# Patient Record
Sex: FEMALE | Race: WHITE
[De-identification: names, ages, dates, MRNs, and addresses within clinical notes are randomized per-mention and may not be internally consistent; named-entity substitution may affect disease eponyms.]

---

## 2017-10-29 ENCOUNTER — HOSPITAL ENCOUNTER (EMERGENCY)
Dept: HOSPITAL 25 - UCEAST | Age: 22
Discharge: HOME | End: 2017-10-29
Payer: COMMERCIAL

## 2017-10-29 VITALS — DIASTOLIC BLOOD PRESSURE: 72 MMHG | SYSTOLIC BLOOD PRESSURE: 135 MMHG

## 2017-10-29 DIAGNOSIS — R30.0: Primary | ICD-10-CM

## 2017-10-29 DIAGNOSIS — Z32.02: ICD-10-CM

## 2017-10-29 DIAGNOSIS — R35.0: ICD-10-CM

## 2017-10-29 DIAGNOSIS — Z88.0: ICD-10-CM

## 2017-10-29 DIAGNOSIS — R39.15: ICD-10-CM

## 2017-10-29 PROCEDURE — 87186 SC STD MICRODIL/AGAR DIL: CPT

## 2017-10-29 PROCEDURE — 81003 URINALYSIS AUTO W/O SCOPE: CPT

## 2017-10-29 PROCEDURE — 84702 CHORIONIC GONADOTROPIN TEST: CPT

## 2017-10-29 PROCEDURE — 87086 URINE CULTURE/COLONY COUNT: CPT

## 2017-10-29 PROCEDURE — 99202 OFFICE O/P NEW SF 15 MIN: CPT

## 2017-10-29 PROCEDURE — G0463 HOSPITAL OUTPT CLINIC VISIT: HCPCS

## 2017-10-29 PROCEDURE — 87077 CULTURE AEROBIC IDENTIFY: CPT

## 2017-10-29 NOTE — UC
Complaint Female HPI





- HPI Summary


HPI Summary: 





21 year old female presents with complains of frequency, urgency and burning 

with urination. 





- History Of Current Complaint


Stated Complaint: DYSURIA


Time Seen by Provider: 10/29/17 19:05


Hx Obtained From: Patient


Onset/Duration: Sudden Onset


Timing: Lasting Hours


Severity Initially: Moderate


Severity Currently: Moderate





- Allergies/Home Medications


Allergies/Adverse Reactions: 


 Allergies











Allergy/AdvReac Type Severity Reaction Status Date / Time


 


Penicillins Allergy Severe Rash Verified 10/29/17 19:07











Home Medications: 


 Home Medications





Ibuprofen TAB* [Advil TAB*] 600 mg PO ONCE PRN 10/29/17 [History Confirmed 10/29

/17]


Iud*  10/29/17 [History]











PMH/Surg Hx/FS Hx/Imm Hx


Previously Healthy: Yes





Review of Systems


Constitutional: Negative


Skin: Negative


Eyes: Negative


ENT: Negative


Respiratory: Negative


Cardiovascular: Negative


Gastrointestinal: Negative


Genitourinary: Frequency, Urgency


Motor: Negative


Neurovascular: Negative


Musculoskeletal: Negative


Neurological: Negative


Psychological: Negative


All Other Systems Reviewed And Are Negative: Yes





Physical Exam


Triage Information Reviewed: Yes


Eye Exam: Normal


ENT Exam: Normal


Dental Exam: Normal


Neck exam: Normal


Neck: Positive: 1


Respiratory Exam: Normal


Cardiovascular Exam: Normal


Abdominal Exam: Normal


Musculoskeletal Exam: Normal


Neurological Exam: Normal


Psychological Exam: Normal


Skin Exam: Normal





 Complaint Female Dx





- Differential Dx/Diagnosis


Provider Diagnoses: urinary frequency.  dysuria.  urinary urgency





Discharge





- Discharge Plan


Condition: Stable


Disposition: HOME


Prescriptions: 


Nitrofurantoin Monohyd Macro [Macrobid] 100 mg PO BID PC #14 cap


Patient Education Materials:  Urinary Tract Infection in Women (ED), Dysuria (ED

)


Referrals: 


Non Staff,Doctor [Primary Care Provider] -

## 2017-10-31 ENCOUNTER — HOSPITAL ENCOUNTER (EMERGENCY)
Dept: HOSPITAL 25 - ED | Age: 22
LOS: 1 days | Discharge: HOME | End: 2017-11-01
Payer: COMMERCIAL

## 2017-10-31 DIAGNOSIS — R42: ICD-10-CM

## 2017-10-31 DIAGNOSIS — R10.84: Primary | ICD-10-CM

## 2017-10-31 DIAGNOSIS — N12: ICD-10-CM

## 2017-10-31 DIAGNOSIS — R30.0: ICD-10-CM

## 2017-10-31 PROCEDURE — 85025 COMPLETE CBC W/AUTO DIFF WBC: CPT

## 2017-10-31 PROCEDURE — 81003 URINALYSIS AUTO W/O SCOPE: CPT

## 2017-10-31 PROCEDURE — 80053 COMPREHEN METABOLIC PANEL: CPT

## 2017-10-31 PROCEDURE — 83690 ASSAY OF LIPASE: CPT

## 2017-10-31 PROCEDURE — 99282 EMERGENCY DEPT VISIT SF MDM: CPT

## 2017-10-31 PROCEDURE — 76775 US EXAM ABDO BACK WALL LIM: CPT

## 2017-10-31 PROCEDURE — 83605 ASSAY OF LACTIC ACID: CPT

## 2017-10-31 PROCEDURE — 96375 TX/PRO/DX INJ NEW DRUG ADDON: CPT

## 2017-10-31 PROCEDURE — 96374 THER/PROPH/DIAG INJ IV PUSH: CPT

## 2017-10-31 PROCEDURE — 81015 MICROSCOPIC EXAM OF URINE: CPT

## 2017-10-31 PROCEDURE — 84702 CHORIONIC GONADOTROPIN TEST: CPT

## 2017-10-31 PROCEDURE — 86141 C-REACTIVE PROTEIN HS: CPT

## 2017-10-31 PROCEDURE — 36415 COLL VENOUS BLD VENIPUNCTURE: CPT

## 2017-11-01 VITALS — DIASTOLIC BLOOD PRESSURE: 74 MMHG | SYSTOLIC BLOOD PRESSURE: 122 MMHG

## 2017-11-01 LAB
ALBUMIN SERPL BCG-MCNC: 4.1 G/DL (ref 3.2–5.2)
ALP SERPL-CCNC: 63 U/L (ref 34–104)
ALT SERPL W P-5'-P-CCNC: 9 U/L (ref 7–52)
ANION GAP SERPL CALC-SCNC: 10 MMOL/L (ref 2–11)
AST SERPL-CCNC: 15 U/L (ref 13–39)
BUN SERPL-MCNC: 9 MG/DL (ref 6–24)
BUN/CREAT SERPL: 12 (ref 8–20)
CALCIUM SERPL-MCNC: 9.2 MG/DL (ref 8.6–10.3)
CHLORIDE SERPL-SCNC: 96 MMOL/L (ref 101–111)
GLOBULIN SER CALC-MCNC: 3.2 G/DL (ref 2–4)
GLUCOSE SERPL-MCNC: 91 MG/DL (ref 70–100)
HCO3 SERPL-SCNC: 23 MMOL/L (ref 22–32)
HCT VFR BLD AUTO: 39 % (ref 35–47)
HGB BLD-MCNC: 13.4 G/DL (ref 12–16)
LIPASE SERPL-CCNC: 27 U/L (ref 11–82)
MCH RBC QN AUTO: 31 PG (ref 27–31)
MCHC RBC AUTO-ENTMCNC: 35 G/DL (ref 31–36)
MCV RBC AUTO: 89 FL (ref 80–97)
POTASSIUM SERPL-SCNC: 3.7 MMOL/L (ref 3.5–5)
PROT SERPL-MCNC: 7.3 G/DL (ref 6.4–8.9)
RBC # BLD AUTO: 4.36 10^6/UL (ref 4–5.4)
SODIUM SERPL-SCNC: 129 MMOL/L (ref 133–145)
WBC # BLD AUTO: 13.8 10^3/UL (ref 3.5–10.8)

## 2017-11-01 NOTE — RAD
INDICATION: Bilateral flank pain and urinary tract infection.



COMPARISON: None

 

TECHNIQUE: Real-time ultrasound examination of the bilateral kidneys and urinary bladder

including grayscale and Doppler color flow analysis.



FINDINGS: 



Bilaterally the kidneys are normal in size and echogenicity. There are no hypervascular

renal masses. There are no renal calculi or hydronephrosis identified.



IMPRESSION: Normal ultrasound of the kidneys.

## 2017-11-01 NOTE — ED
GI/ HPI





- HPI Summary


HPI Summary: 


21F presents with bilateral flank pain, dizziness today.  She was seen at 

urgent care and dx with uti and seemed to get better and then it got worst.  

She was seen at [facility] today and told she has pyelo.  she has taken two dose 

of cipro today.  She admits to nausea.  She denies any fever or injury to the 

back.  She denies any v/d/c.  She denies any vaginal discharge.  She has had a 

cough because she is recovering from a cold.  no SOB or chest pain. no 

abdominal pain. She has not eaten much today.  the dysuria has improved








- History of Current Complaint


Chief Complaint: EDBackInjuryPain


Time Seen by Provider: 10/31/17 23:01


Stated Complaint: BACK PAIN/NAUSEA/DIZZY


Hx Last Menstrual Period: NOW (HAS IUD)


Pain Intensity: 5





- Allergy/Home Medications


Allergies/Adverse Reactions: 


 Allergies











Allergy/AdvReac Type Severity Reaction Status Date / Time


 


Penicillins Allergy Severe Rash Verified 10/31/17 22:39














PMH/Surg Hx/FS Hx/Imm Hx


Endocrine/Hematology History: 


   Denies: Hx Anticoagulant Therapy


Cardiovascular History: 


   Denies: Hx Hypertension


Respiratory History: Reports: Hx Asthma





- Surgical History


Surgery Procedure, Year, and Place: WISDOM TEETH


Infectious Disease History: No


Infectious Disease History: 


   Denies: Traveled Outside the [place] in Last 30 Days





- Family History


Known Family History: 


   Negative: Diabetes





- Social History


Alcohol Use: Occasionally


Substance Use Type: Reports: None


Smoking Status (MU): Never Smoked Tobacco





Review of Systems


Negative: Fever


Negative: Chest Pain


Negative: Shortness Of Breath


Positive: Nausea.  Negative: Vomiting


Positive: dysuria, flank pain


All Other Systems Reviewed And Are Negative: Yes





Physical Exam


Triage Information Reviewed: Yes


Vital Signs On Initial Exam: 


 Initial Vitals











Temp Pulse Resp BP Pulse Ox


 


 98.6 F   70   16   141/68   100 


 


 10/31/17 22:35  10/31/17 22:35  10/31/17 22:35  10/31/17 22:35  10/31/17 22:35











Vital Signs Reviewed: Yes


Appearance: Positive: Well-Appearing


Skin: Positive: Warm, Dry


Head/Face: Positive: Normal Head/Face Inspection


Eyes: Positive: Normal, EOMI, SCOTTY, Conjunctiva Clear


ENT: Positive: Normal ENT inspection, Pharynx normal, TMs normal


Respiratory/Lung Sounds: Positive: Clear to Auscultation, Breath Sounds Present


Cardiovascular: Positive: Normal, RRR


Abdomen Description: Positive: Nontender, Soft, CVA Tenderness (R), CVA 

Tenderness (L)


Bowel Sounds: Positive: Present


Musculoskeletal: Positive: Normal


Neurological: Positive: Normal





- Gunnar Coma Scale


Coma Scale Total: 15





Diagnostics





- Vital Signs


 Vital Signs











  Temp Pulse Resp BP Pulse Ox


 


 10/31/17 22:35  98.6 F  70  16  141/68  100














- Laboratory


Lab Results: 


 Lab Results











  10/31/17 Range/Units





  22:55 


 


Urine Color  Straw  


 


Urine Appearance  Clear  


 


Urine pH  6.0  (5-9)  


 


Ur Specific Gravity  1.002 L  (1.010-1.030)  


 


Urine Protein  Negative  (Negative)  


 


Urine Ketones  Negative  (Negative)  


 


Urine Blood  1+ H  (Negative)  


 


Urine Nitrate  Negative  (Negative)  


 


Urine Bilirubin  Negative  (Negative)  


 


Urine Urobilinogen  Negative  (Negative)  


 


Ur Leukocyte Esterase  Negative  (Negative)  


 


Urine WBC (Auto)  Trace(0-5/hpf)  (Absent)  


 


Urine RBC (Auto)  Trace(0-2/hpf)  (Absent)  


 


Urine Bacteria  Absent  (Absent)  


 


Urine Glucose  Negative  (Negative)  











Result Diagrams: 


 10/31/17 23:55





 10/31/17 23:55


Lab Statement: Any lab studies that have been ordered have been reviewed, and 

results considered in the medical decision making process.





- Ultrasound


  ** No standard instances


Ultrasound Interpretation: No Acute Changes


Ultrasound Interpretation Completed By: Radiologist





GIGMARIKA Course/Dx





- Course


Course Of Treatment: 21F presents with bilateral flank pain, dizziness today.  

She was seen at urgent care and dx with uti and seemed to get better and then 

it got worst.  She was seen at [facility] today and told she has pyelo.  she has 

taken two dose of cipro today.  She admits to nausea.  She denies any fever or 

injury to the back.  She denies any v/d/c.  She denies any vaginal discharge.  

She has had a cough because she is recovering from a cold.  no SOB or chest 

pain. no abdominal pain. She has not eaten much today.  on exam abdomen 

nontender. pos bilateral CVA tenderness. labs wbc 13, urine no infection 

currently. renal u/s normal. gave fluids and feeling better. not septic. 

discussed needs to continue antibiotic as only been on it for a day so has not 

failed outpt therapy yet. patient understand and agrees with plan.





- Diagnoses


Differential Diagnoses - Female: Pelvic Inflammatory Disease, Pyelonephritis, 

Urinary Tract Infection, Ureteral Calculi


Provider Diagnoses: 


 Pyelonephritis








Discharge





- Discharge Plan


Condition: Good


Disposition: HOME


Prescriptions: 


Ondansetron ODT TAB* [Zofran 4 MG Odt TAB*] 4 mg PO Q6H PRN #20 tab.odt


 PRN Reason: Nausea


Patient Education Materials:  Kidney Infection (ED)


Referrals: 


[facility] - [facility] [Primary Care Provider] - 


Additional Instructions: 


Take antibiotic as prescribed


Use Zofran every 6 hours for nausea as needed


Drink plenty of water


Use alternative forms of birth control 


Take Tylenol or ibuprofen every 6 hours as needed for pain and fever


Return to ED if develop severe vomiting, or any new or worsening symptoms

## 2019-03-17 ENCOUNTER — HOSPITAL ENCOUNTER (EMERGENCY)
Dept: HOSPITAL 25 - UCEAST | Age: 24
Discharge: HOME | End: 2019-03-17
Payer: COMMERCIAL

## 2019-03-17 VITALS — SYSTOLIC BLOOD PRESSURE: 134 MMHG | DIASTOLIC BLOOD PRESSURE: 91 MMHG

## 2019-03-17 DIAGNOSIS — R30.0: Primary | ICD-10-CM

## 2019-03-17 DIAGNOSIS — R35.0: ICD-10-CM

## 2019-03-17 DIAGNOSIS — Z88.0: ICD-10-CM

## 2019-03-17 DIAGNOSIS — R03.0: ICD-10-CM

## 2019-03-17 PROCEDURE — 84702 CHORIONIC GONADOTROPIN TEST: CPT

## 2019-03-17 PROCEDURE — 87086 URINE CULTURE/COLONY COUNT: CPT

## 2019-03-17 PROCEDURE — 99212 OFFICE O/P EST SF 10 MIN: CPT

## 2019-03-17 PROCEDURE — G0463 HOSPITAL OUTPT CLINIC VISIT: HCPCS

## 2019-03-17 PROCEDURE — 81003 URINALYSIS AUTO W/O SCOPE: CPT

## 2019-03-17 NOTE — UC
Complaint Female HPI





- HPI Summary


HPI Summary: 





22 y/o female presents to the urgent care c/o burning and frequency on 

urination for the past 3 days. Pt reports last year she had similar symptoms 

and was seen here at the clinic and Dx w/ UTI and Rx Macrobid. However 

antibiotic dind't work since she end up w/ pyelonephritis and had to go to the 

ER for further management. Pt also states this morning she did an OTC UA and 

was positive for leukoesteraces. She has been drinking a lot of water to 

alleviate symptoms. Pain w/ urination is mild 2/10. Pt denies fever, flank pain

, Hx of kidney stones, pelvic pain, lower back pain, vaginal discharge, 

abdominal pain, N/V/d. Hx of STD's








- History Of Current Complaint


Stated Complaint: URINARY ISSUE


Time Seen by Provider: 03/17/19 12:01


Hx Obtained From: Patient


Hx Last Menstrual Period: NOW (HAS IUD)


Pregnant?: No


Onset/Duration: Gradual Onset, Lasting Days - 3 days, Still Present


Timing: Intermittent, Lasting Seconds


Severity Initially: Mild


Severity Currently: Mild


Pain Intensity: 2


Pain Scale Used: 0-10 Numeric


Character: Burning


Aggravating Factor(s): Urination


Alleviating Factor(s): Nothing


Associated Signs And Symptoms: Positive: Negative.  Negative: Fever, Back Pain, 

Vaginal Discharge, Nausea, Vomiting(# Of Episodes =), Genital Swelling, Genital 

Blisters





- Risk Factors


Ectopic Pregnancy Risk Factor: Negative





- Allergies/Home Medications


Allergies/Adverse Reactions: 


 Allergies











Allergy/AdvReac Type Severity Reaction Status Date / Time


 


MS Penicillins [Penicillins] Allergy Severe Rash Verified 10/31/17 22:39


 


Penicillins Allergy  Rash Verified 03/17/19 12:04














PMH/Surg Hx/FS Hx/Imm Hx


Previously Healthy: Yes


Other GI/ History: pyelonephritis last year


Other History Of: 


   Negative For: Anticoagulant Therapy





- Surgical History


Surgical History: Yes


Surgery Procedure, Year, and Place: WISDOM TEETH





- Family History


Known Family History: Positive: Diabetes





- Social History


Occupation: Employed Full-time


Lives: With Family


Alcohol Use: Occasionally


Substance Use Type: None


Smoking Status (MU): Never Smoked Tobacco





Review of Systems


All Other Systems Reviewed And Are Negative: Yes


Constitutional: Positive: Negative


Skin: Positive: Negative


Eyes: Positive: Negative


ENT: Positive: Negative


Respiratory: Positive: Negative


Cardiovascular: Positive: Negative


Genitourinary: Positive: Dysuria, Frequency, Urgency


Motor: Positive: Negative


Neurovascular: Positive: Negative


Musculoskeletal: Positive: Negative


Neurological: Positive: Negative


Psychological: Positive: Negative


Is Patient Immunocompromised?: No





Physical Exam





- Summary


Physical Exam Summary: 





VITAL SIGNS: Reviewed. 


GENERAL: Patient is a well developed and nourished female who is sitting 

comfortable in the examining table. Patient is not in any acute respiratory 

distress. 


HEAD AND FACE: No signs of trauma. No ecchymosis, hematomas or skull 

depressions. No sinus tenderness. 


EYES: PERRLA, EOMI x 2, No injected conjunctiva, clear watery eyes, no 

nystagmus. No photophobia.


EARS: Hearing grossly intact. Ear canals and tympanic membranes are within 

normal limits. 


MOUTH: pharynx with no erythema, no exudates,no palatal petechiae. no B/L 

tonsillar enlargement Uvula in midline. 


NECK: Supple, trachea is midline, no lymphadenopathy, no JVD, no carotid bruit, 

no c-spine tenderness, neck with full ROM. 


CHEST: Symmetric, no tenderness at palpation 


LUNGS: Clear to auscultation bilaterally. No wheezing or crackles.


CVS: Regular rate and rhythm, S1 and S2 present, no murmurs or gallops 

appreciated. 


ABDOMEN: Soft, non-tender. No signs of distention. No rebound no guarding, and 

no masses palpated. Bowel sounds are normal. 


BACK:no scoliosis or lesions, non tender to palpation, No B/L CVA tenderness


EXTREMITIES: FROM in all major joints, no edema, no cyanosis or clubbing.


NEURO: Alert and oriented x 3. No acute neurological deficits. Speech is normal 

and follows commands. 


SKIN: Dry and warm 

















Triage Information Reviewed: Yes





 Complaint Female Dx





- Course


Course Of Treatment: 





22 y/o female presents to the urgent care c/o burning and frequency on 

urination for the past 3 days. Pt reports last year she had similar symptoms 

and was seen here at the clinic and Dx w/ UTI and Rx Macrobid. However 

antibiotic dind't work since she end up w/ pyelonephritis and had to go to the 

ER for further management. Pt also states this morning she did an OTC UA and 

was positive for leukoesteraces. She has been drinking a lot of water to 

alleviate symptoms. Pain w/ urination is mild 2/10. Pt denies fever, flank pain

, Hx of kidney stones, pelvic pain, lower back pain, vaginal discharge, 

abdominal pain, N/V/d. Hx of STD's. Hx obtained. UA and pregnancy test ordered. 

UA results: negative. Pregnancy test: negative.  Pt states she would like to 

get a Rx for antibiotics due to her previous Pyelonephritis. Pt Rx  Pyridium 

100mg PO TID x 2 days. Also Ciprofloxacin PO and advised to take it only if 

symptoms worsen or urine culture returns positive. Advised to increase fluid 

intake. Urine sent for culture if any abnormality Pt will be notified.  Pt's BP 

is elevated today advised to decrease salt in diet, monitor BP and f/u with PCP 

for further management. Pt advised If symptoms do not improve to return to the 

urgent care or f/u with PCP. Pt understood and agreed. Left the clinic 

ambulating.











- Differential Dx/Diagnosis


Differential Diagnosis/HQI/PQRI: Cervicitis, Pelvic Inflammatory Disease, 

Pregnancy, Renal Colic, Ureteral Stone, Urinary Tract Infection


Provider Diagnosis: 


 Dysuria, Elevated BP without diagnosis of hypertension








Discharge





- Sign-Out/Discharge


Documenting (check all that apply): Patient Departure - d/c home


All imaging exams completed and their final reports reviewed: No Studies





- Discharge Plan


Condition: Stable


Disposition: HOME


Prescriptions: 


Ciprofloxacin TAB* [Cipro 250 MG Tab*] 250 mg PO BID #14 tab


Phenazopyridine TAB* [Pyridium 100 mg TAB*] 100 mg PO TID #6 tab


Patient Education Materials:  Dysuria (ED)


Referrals: 


[facility] - [facility] BENAV[facility] [Primary Care Provider] - 3 Days


Additional Instructions: 


1- Please take Pyridium 100 mg PO TID x 2 days to alleviate urinary symptoms. 

Increase increase fluid intake. drink cranberry juice.


2- Urine was completely negative. However urine culture ordered and send to lab 

to r/o any abnormality, since you states the UA you did this morning was 

positive for Leukoesteraces .


3- As per your request a Rx of  Ciprofloxacin PO was send to pharmacy due to Hx 

of Pyelonephritis last year. Only start taking it if symptoms worsen or urine 

culture returns positive. 


4-If symptoms do not improve please return to the urgent care or f/u with  PCP 

in 2-3 day. 


5-Your BP is elevated today. please decrease salt in your diet, monitor BP and 

if it continues to be elevated please f/u with your PCP for further management.























- Billing Disposition and Condition


Condition: STABLE


Disposition: Home





- Attestation Statements


Provider Attestation: 


I was available for consult. This patient was seen by the RAY. The patient was 

not presented to , seen by or examined by me -Helga Gentile MD